# Patient Record
Sex: FEMALE | Race: BLACK OR AFRICAN AMERICAN | NOT HISPANIC OR LATINO | URBAN - NONMETROPOLITAN AREA
[De-identification: names, ages, dates, MRNs, and addresses within clinical notes are randomized per-mention and may not be internally consistent; named-entity substitution may affect disease eponyms.]

---

## 2022-03-01 PROBLEM — K86.81 EXOCRINE PANCREATIC INSUFFICIENCY: Chronic | Status: CHRONIC | Noted: 2022-03-01

## 2022-09-06 PROBLEM — E11.9 DIABETES MELLITUS TYPE II, NON INSULIN DEPENDENT (CMS/HCC): Chronic | Status: CHRONIC | Noted: 2022-09-06

## 2022-09-06 PROBLEM — I10 ESSENTIAL HYPERTENSION: Chronic | Status: CHRONIC | Noted: 2022-09-06

## 2023-03-09 ENCOUNTER — OFFICE (OUTPATIENT)
Dept: URBAN - NONMETROPOLITAN AREA CLINIC 1 | Facility: CLINIC | Age: 63
End: 2023-03-09

## 2023-03-09 VITALS
DIASTOLIC BLOOD PRESSURE: 87 MMHG | SYSTOLIC BLOOD PRESSURE: 136 MMHG | HEART RATE: 98 BPM | HEIGHT: 60 IN | WEIGHT: 204 LBS

## 2023-03-09 DIAGNOSIS — K59.00 CONSTIPATION, UNSPECIFIED: ICD-10-CM

## 2023-03-09 DIAGNOSIS — K86.81 EXOCRINE PANCREATIC INSUFFICIENCY: ICD-10-CM

## 2023-03-09 DIAGNOSIS — K21.9 GASTRO-ESOPHAGEAL REFLUX DISEASE WITHOUT ESOPHAGITIS: ICD-10-CM

## 2023-03-09 PROCEDURE — 99214 OFFICE O/P EST MOD 30 MIN: CPT | Performed by: INTERNAL MEDICINE

## 2023-09-06 ENCOUNTER — OFFICE (OUTPATIENT)
Dept: URBAN - NONMETROPOLITAN AREA CLINIC 1 | Facility: CLINIC | Age: 63
End: 2023-09-06

## 2023-09-06 VITALS
SYSTOLIC BLOOD PRESSURE: 108 MMHG | HEIGHT: 60 IN | DIASTOLIC BLOOD PRESSURE: 78 MMHG | HEART RATE: 93 BPM | WEIGHT: 208 LBS

## 2023-09-06 DIAGNOSIS — K21.9 GASTRO-ESOPHAGEAL REFLUX DISEASE WITHOUT ESOPHAGITIS: ICD-10-CM

## 2023-09-06 DIAGNOSIS — D3A.8 OTHER BENIGN NEUROENDOCRINE TUMORS: ICD-10-CM

## 2023-09-06 DIAGNOSIS — K86.89 OTHER SPECIFIED DISEASES OF PANCREAS: ICD-10-CM

## 2023-09-06 DIAGNOSIS — R11.0 NAUSEA: ICD-10-CM

## 2023-09-06 DIAGNOSIS — K59.00 CONSTIPATION, UNSPECIFIED: ICD-10-CM

## 2023-09-06 PROCEDURE — 99214 OFFICE O/P EST MOD 30 MIN: CPT | Performed by: INTERNAL MEDICINE

## 2024-01-26 ENCOUNTER — OFFICE (OUTPATIENT)
Dept: URBAN - NONMETROPOLITAN AREA CLINIC 1 | Facility: CLINIC | Age: 64
End: 2024-01-26

## 2024-01-26 VITALS
HEART RATE: 99 BPM | DIASTOLIC BLOOD PRESSURE: 79 MMHG | WEIGHT: 212 LBS | HEIGHT: 60 IN | SYSTOLIC BLOOD PRESSURE: 131 MMHG

## 2024-01-26 DIAGNOSIS — K59.00 CONSTIPATION, UNSPECIFIED: ICD-10-CM

## 2024-01-26 DIAGNOSIS — K86.81 EXOCRINE PANCREATIC INSUFFICIENCY: ICD-10-CM

## 2024-01-26 DIAGNOSIS — R10.84 GENERALIZED ABDOMINAL PAIN: ICD-10-CM

## 2024-01-26 DIAGNOSIS — K21.9 GASTRO-ESOPHAGEAL REFLUX DISEASE WITHOUT ESOPHAGITIS: ICD-10-CM

## 2024-01-26 PROCEDURE — 99213 OFFICE O/P EST LOW 20 MIN: CPT | Performed by: NURSE PRACTITIONER

## 2024-01-26 RX ORDER — PANTOPRAZOLE 40 MG/1
TABLET, DELAYED RELEASE ORAL
Qty: 90 | Refills: 1 | Status: ACTIVE

## 2024-01-26 RX ORDER — FAMOTIDINE 40 MG/1
TABLET, FILM COATED ORAL
Qty: 90 | Refills: 1 | Status: ACTIVE
Start: 2024-01-26

## 2024-04-19 ENCOUNTER — OFFICE (OUTPATIENT)
Dept: URBAN - NONMETROPOLITAN AREA CLINIC 1 | Facility: CLINIC | Age: 64
End: 2024-04-19

## 2024-04-19 VITALS
SYSTOLIC BLOOD PRESSURE: 126 MMHG | DIASTOLIC BLOOD PRESSURE: 88 MMHG | WEIGHT: 210 LBS | HEART RATE: 88 BPM | HEIGHT: 60 IN

## 2024-04-19 DIAGNOSIS — K86.81 EXOCRINE PANCREATIC INSUFFICIENCY: ICD-10-CM

## 2024-04-19 DIAGNOSIS — K58.9 IRRITABLE BOWEL SYNDROME WITHOUT DIARRHEA: ICD-10-CM

## 2024-04-19 PROCEDURE — 99214 OFFICE O/P EST MOD 30 MIN: CPT | Performed by: INTERNAL MEDICINE

## 2025-04-08 ENCOUNTER — OFFICE (OUTPATIENT)
Dept: URBAN - NONMETROPOLITAN AREA CLINIC 1 | Facility: CLINIC | Age: 65
End: 2025-04-08

## 2025-04-08 VITALS
WEIGHT: 209 LBS | HEIGHT: 60 IN | SYSTOLIC BLOOD PRESSURE: 116 MMHG | HEART RATE: 94 BPM | DIASTOLIC BLOOD PRESSURE: 69 MMHG

## 2025-04-08 DIAGNOSIS — R10.30 LOWER ABDOMINAL PAIN, UNSPECIFIED: ICD-10-CM

## 2025-04-08 DIAGNOSIS — Z85.9 PERSONAL HISTORY OF MALIGNANT NEOPLASM, UNSPECIFIED: ICD-10-CM

## 2025-04-08 DIAGNOSIS — R11.0 NAUSEA: ICD-10-CM

## 2025-04-08 DIAGNOSIS — K86.89 OTHER SPECIFIED DISEASES OF PANCREAS: ICD-10-CM

## 2025-04-08 DIAGNOSIS — K57.90 DIVERTICULOSIS OF INTESTINE, PART UNSPECIFIED, WITHOUT PERFO: ICD-10-CM

## 2025-04-08 DIAGNOSIS — K92.1 MELENA: ICD-10-CM

## 2025-04-08 DIAGNOSIS — Z08 ENCOUNTER FOR FOLLOW-UP EXAMINATION AFTER COMPLETED TREATMEN: ICD-10-CM

## 2025-04-08 DIAGNOSIS — K21.9 GASTRO-ESOPHAGEAL REFLUX DISEASE WITHOUT ESOPHAGITIS: ICD-10-CM

## 2025-04-08 PROCEDURE — 99214 OFFICE O/P EST MOD 30 MIN: CPT | Performed by: NURSE PRACTITIONER

## 2025-04-08 RX ORDER — OMEPRAZOLE 40 MG/1
40 CAPSULE, DELAYED RELEASE ORAL
Qty: 90 | Refills: 3 | Status: ACTIVE
Start: 2025-04-08

## 2025-04-08 RX ORDER — PANCRELIPASE 36000; 180000; 114000 [USP'U]/1; [USP'U]/1; [USP'U]/1
CAPSULE, DELAYED RELEASE PELLETS ORAL
Qty: 240 | Refills: 6 | Status: ACTIVE

## 2025-04-08 RX ORDER — DICYCLOMINE HYDROCHLORIDE 10 MG/1
CAPSULE ORAL
Qty: 120 | Refills: 3 | Status: ACTIVE

## 2025-04-08 NOTE — SERVICENOTES
Risks of procedures explained to patient and she wishes to proceed
Bowel prep prescribed and instructions given to patient
Colonoscopy for hematochezia, lower abd pain, IBS and history of colon polyps
EGD for worsening gerd, vomiting bile, persistent nausea, 
Stop pantoprazole and start Omeprazole 40mg as above and continue Famotidine 40mg po qhs for gerd and night time reflux
Continue Dicyclomine prn abdominal cramping for ibs
Continue Creon as above for epi-refills prescribed
Stressed importance of high fiber and in setting of diverticulosis
Continue Miralax, and other anti-constipation measures to help promote daily bm 
Avoid NSAIDs
weight loss encouraged
hx of neuroendocrine tumor pancreas-followed and managed by Dr. Culver, oncology [facility].-last imaging 2023 
F/u after procedures.

## 2025-04-08 NOTE — SERVICEHPINOTES
Patient presents to the clinic today for follow up of IBS.  She c/o lower abd pain with defecation accompanied by bright red bloody stools.  She has 2-3 BM daily.  She takes Dicyclomine for IBS with little relief.  She was prescribed IBGuard and Miralax in the past for alternating bowel habits, however, this does not seem to help any longer.  Her last colonoscopy was in 2018 that revealed diverticulosis of sigmoid colon and 3 diminutive flat HP sigmoid colon polyps.  She has nausea worse in the morning in which Zofran helps this subside.  She will occasionally vomit periodically throughout the day, regardless of meal intake, but states it is bile acid she vomits, not food.  SHe has chronic gerd in which she takes Pantoprazole 40mg po qAM and Pepcid 40mg po qhs without relief.  She has worsening reflux qhs.  Denies dysphagia.  Her last EGD was in 2022 bx neg.  She has a history of pancreatic neuroendorcrine tumor s/p distal pancreatectomy followed by oncology [facility] and subsequent EPI in which she takes Creon 1 cap ac meals- needs refills.  Denies cardiac stents, anticoagulation therapy, ckd, or supplemental oxygenation use.  Denies tobacco use or excessive NSAID use. br
br
Colonoscopy by Dr. Peng 1/2018brFindings: There was evidence of diverticulosis in the sigmoid colon. Three diminutive flat polyps were found in the sigmoid colon. The polyps were removed by cold biopsy polypectomy. Diminutive and small internal hemorrhoids were found.
br2.   endoscopic biopsy, sigmoid polyp:br     - Hyperplastic polyp with mucosal prolapse effect.                JCC/td br 
br br  Per Dr. Smith OV 4/2024
br Patient is a 62-year-old female with a history of pancreatic neuroendocrine tumor status post distal pancreatectomy and subsequent pancreatic insufficiency presenting to clinic for routine follow-up. She reports she has done relatively well overall since her last visit in January 2024. She does complain of ongoing alternating diarrhea and constipation where it is at her last visit she was complaining of more constipation. He was advised that she take daily MiraLax and she was also prescribed ib guard which she has been taking faithfully. She states the MiraLax helped some but she only takes it as needed as it causes diarrhea. She will go a couple of days without having a bowel movement and then have some diarrhea. She denies any blood in the stools. She does not take fiber supplementation. She does report the ib Kaykay has helped considerably with her abdominal cramping and she has continued to take it. She does continue to take 72,000 Creon with meals and snacks and reports this works very well. She complains of lower abdominal bloating which is worse when she needs to have a bowel movement and improves after defecation. She does take dicyclomine as needed which helps a bit but not entirely.brGI History:brDenies family history of colon polyps/cancerbr9/2013 egd dil by dr Pugh: There was an acquired Schatzki's ring in the GE junction. Dilatation was performed, using a Savary bougie with a guidewire. A 54 Fr dilator was passed. Moderate force was required. Otherwise, the esophagus appeared to be normal. The stomach appeared to be normal. The duodenum appeared to be normal.1/2018 colonoscopy by dr Kaur: There was evidence of diverticulosis in the sigmoid colon. Three diminutive flat polyps were found in the sigmoid colon. The polyps were removed by cold biopsy polypectomy. Diminutive and small internal hemorrhoids were found.1/2018 egd/eus by dr peng-IMPRESSION:br1. Mild gastritis, status post biopsies.br2. Pancreatic tail cystic lesion with worrisome features suggestive septation and internal debris. Fine-needle aspiration biopsies were performed. It could be mucinous cystic neoplasm or already developing occult malignancy cannot be excluded.RECOMMENDATION:br1. If biopsy confirms malignancy, then it would be staged T2 N0 MX for endosonographic criteria.br2. The patient to follow up with Dr. Seth Richards for consideration for distal pancreatectomy given worrisome features of cystic mass lesion.brFinal Pathologic Diagnosis:br1. endoscopic biopsy, gastric:br- Benign gastric mucosa with patchy moderate chronic inflammation arising in a background of reactive gastropathy comprised of lamina propria edema, vascular congestion, and foveolar cell hyperplasia.br- Negative for Helicobacter pylori-like organisms (immunohistochemical stain, H. pylori).br2. endoscopic biopsy, sigmoid polyp:br- Hyperplastic polyp with mucosal prolapse effectbrFinal Cytologic DiagnosisbrPancreas, FNA tail recd in cytolyt (tp,cb):brHypocellular specimen.brNegative for malignant cells.brAcute and chronic inflammatory cells present.Pathology of pancreas cyst negative for malignancy but showing pancreas tail 3 cm cyst with high risk features.brBetter managed by distal pancreatectomy.brWill have patient see  and discuss the plans.brFollowup colonoscopy for colon polps surveillance in 10 years.2/2018 dr seth richards-brPOSTOPERATIVE DIAGNOSIS: Pancreatic tail cystic neoplasm.brPROCEDURE: Just pancreatectomy and splenectomy.brFinal Pathologic Diagnosis:brDISTAL PANCREAS AND SPLEEN, PARTIAL PANCREATECTOMY AND SPLENECTOMY:brWell differentiated neuroendocrine tumor, grade 1 of 3, oncocytic type.9/2019 fecal elastase low 1979/2022 egd by dr smith-Findings:br· Esophagus: The esophageal mucosa was normal with no esophagitis, Alford's esophagus, varices, mass, or stricture.br· GE Junction: Normal. No hiatal hernia.br· Stomach: Mild, patchy erythema and friability was noted in the antrum consistent with probable gastritis. Multiple random cold forceps biopsies were taken from the antrum and body of the stomach and placed in jar one.br· Pylorus: Normal and patent.br· Duodenum: Normal with no ulcer, duodenitis, mass, AVM, or stricture.brFinal Pathologic Diagnosis:brSTOMACH, ENDOSCOPIC BIOPSY:brBenign fragments of gastric mucosa exhibiting features of chemical/reactive gastropathy, a phase of acute erosive gastritis.brImmunohistochemical stain for Helicobacter organisms is negative.11/2022 ct a/p w-brIMPRESSION:brNo evidence of residual/recurrent disease.5/4/23 ct a/p w-IMPRESSION:br1. Evidence of distal pancreatectomy and splenectomy, with nobrevidence of metastatic disease within the abdomen or pelvis. Stablebrexamination.